# Patient Record
Sex: FEMALE | Race: WHITE
[De-identification: names, ages, dates, MRNs, and addresses within clinical notes are randomized per-mention and may not be internally consistent; named-entity substitution may affect disease eponyms.]

---

## 2019-12-23 ENCOUNTER — HOSPITAL ENCOUNTER (EMERGENCY)
Dept: HOSPITAL 94 - ER | Age: 69
Discharge: HOME | End: 2019-12-23
Payer: MEDICARE

## 2019-12-23 VITALS — DIASTOLIC BLOOD PRESSURE: 70 MMHG | SYSTOLIC BLOOD PRESSURE: 137 MMHG

## 2019-12-23 VITALS — WEIGHT: 178.57 LBS | BODY MASS INDEX: 31.64 KG/M2 | HEIGHT: 63 IN

## 2019-12-23 DIAGNOSIS — E03.9: ICD-10-CM

## 2019-12-23 DIAGNOSIS — Z88.1: ICD-10-CM

## 2019-12-23 DIAGNOSIS — Z98.890: ICD-10-CM

## 2019-12-23 DIAGNOSIS — Z79.899: ICD-10-CM

## 2019-12-23 DIAGNOSIS — F41.9: ICD-10-CM

## 2019-12-23 DIAGNOSIS — Z79.2: ICD-10-CM

## 2019-12-23 DIAGNOSIS — Z88.6: ICD-10-CM

## 2019-12-23 DIAGNOSIS — I10: ICD-10-CM

## 2019-12-23 DIAGNOSIS — J20.9: Primary | ICD-10-CM

## 2019-12-23 DIAGNOSIS — G89.29: ICD-10-CM

## 2019-12-23 DIAGNOSIS — J44.9: ICD-10-CM

## 2019-12-23 DIAGNOSIS — E11.9: ICD-10-CM

## 2019-12-23 DIAGNOSIS — Z88.8: ICD-10-CM

## 2019-12-23 LAB
ALBUMIN SERPL BCP-MCNC: 3.5 G/DL (ref 3.4–5)
ALBUMIN/GLOB SERPL: 0.9 {RATIO} (ref 1.1–1.5)
ALP SERPL-CCNC: 69 IU/L (ref 46–116)
ALT SERPL W P-5'-P-CCNC: 35 U/L (ref 12–78)
ANION GAP SERPL CALCULATED.3IONS-SCNC: 7 MMOL/L (ref 8–16)
APTT PPP: 28 SECONDS (ref 22–32)
AST SERPL W P-5'-P-CCNC: 20 U/L (ref 10–37)
BASOPHILS # BLD AUTO: 0.1 X10'3 (ref 0–0.2)
BASOPHILS NFR BLD AUTO: 0.7 % (ref 0–1)
BILIRUB SERPL-MCNC: 0.4 MG/DL (ref 0.1–1)
BUN SERPL-MCNC: 8 MG/DL (ref 7–18)
BUN/CREAT SERPL: 8.6 (ref 6.6–38)
CALCIUM SERPL-MCNC: 9.1 MG/DL (ref 8.5–10.1)
CHLORIDE SERPL-SCNC: 104 MMOL/L (ref 99–107)
CO2 SERPL-SCNC: 30 MMOL/L (ref 24–32)
CREAT SERPL-MCNC: 0.93 MG/DL (ref 0.4–0.9)
EOSINOPHIL # BLD AUTO: 0.1 X10'3 (ref 0–0.9)
EOSINOPHIL NFR BLD AUTO: 1.4 % (ref 0–6)
ERYTHROCYTE [DISTWIDTH] IN BLOOD BY AUTOMATED COUNT: 14.9 % (ref 11.5–14.5)
GFR SERPL CREATININE-BSD FRML MDRD: 60 ML/MIN
GLUCOSE SERPL-MCNC: 164 MG/DL (ref 70–104)
HCT VFR BLD AUTO: 43.5 % (ref 35–45)
HGB BLD-MCNC: 14.6 G/DL (ref 12–16)
LYMPHOCYTES # BLD AUTO: 2 X10'3 (ref 1.1–4.8)
LYMPHOCYTES NFR BLD AUTO: 21.2 % (ref 21–51)
MAGNESIUM SERPL-MCNC: 1.9 MG/DL (ref 1.5–2.4)
MCH RBC QN AUTO: 29.1 PG (ref 27–31)
MCHC RBC AUTO-ENTMCNC: 33.6 G/DL (ref 33–36.5)
MCV RBC AUTO: 86.6 FL (ref 78–98)
MONOCYTES # BLD AUTO: 0.8 X10'3 (ref 0–0.9)
MONOCYTES NFR BLD AUTO: 8.4 % (ref 2–12)
NEUTROPHILS # BLD AUTO: 6.4 X10'3 (ref 1.8–7.7)
NEUTROPHILS NFR BLD AUTO: 68.3 % (ref 42–75)
PLATELET # BLD AUTO: 260 X10'3 (ref 140–440)
PMV BLD AUTO: 8.8 FL (ref 7.4–10.4)
POTASSIUM SERPL-SCNC: 4.1 MMOL/L (ref 3.5–5.1)
PROT SERPL-MCNC: 7.4 G/DL (ref 6.4–8.2)
RBC # BLD AUTO: 5.02 X10'6 (ref 4.2–5.6)
SODIUM SERPL-SCNC: 141 MMOL/L (ref 135–145)
WBC # BLD AUTO: 9.4 X10'3 (ref 4.5–11)

## 2019-12-23 PROCEDURE — 93005 ELECTROCARDIOGRAM TRACING: CPT

## 2019-12-23 PROCEDURE — 83735 ASSAY OF MAGNESIUM: CPT

## 2019-12-23 PROCEDURE — 80053 COMPREHEN METABOLIC PANEL: CPT

## 2019-12-23 PROCEDURE — 94760 N-INVAS EAR/PLS OXIMETRY 1: CPT

## 2019-12-23 PROCEDURE — 87502 INFLUENZA DNA AMP PROBE: CPT

## 2019-12-23 PROCEDURE — 87040 BLOOD CULTURE FOR BACTERIA: CPT

## 2019-12-23 PROCEDURE — 83880 ASSAY OF NATRIURETIC PEPTIDE: CPT

## 2019-12-23 PROCEDURE — 94644 CONT INHLJ TX 1ST HOUR: CPT

## 2019-12-23 PROCEDURE — 85730 THROMBOPLASTIN TIME PARTIAL: CPT

## 2019-12-23 PROCEDURE — 87503 INFLUENZA DNA AMP PROB ADDL: CPT

## 2019-12-23 PROCEDURE — 71046 X-RAY EXAM CHEST 2 VIEWS: CPT

## 2019-12-23 PROCEDURE — 99285 EMERGENCY DEPT VISIT HI MDM: CPT

## 2019-12-23 PROCEDURE — 94640 AIRWAY INHALATION TREATMENT: CPT

## 2019-12-23 PROCEDURE — 85610 PROTHROMBIN TIME: CPT

## 2019-12-23 PROCEDURE — 85025 COMPLETE CBC W/AUTO DIFF WBC: CPT

## 2019-12-23 PROCEDURE — 84484 ASSAY OF TROPONIN QUANT: CPT

## 2019-12-23 PROCEDURE — 36415 COLL VENOUS BLD VENIPUNCTURE: CPT

## 2019-12-23 PROCEDURE — 83605 ASSAY OF LACTIC ACID: CPT

## 2019-12-23 NOTE — NUR
PATIENT C/O CP, HEAVINESS EPIGASTRIC, RADUATING TO BACK, DR DEL VALLE INFORMED, STAT 
EKG DONE, RIGHT AC PIV INSERTED 20 G, VSS, SR

## 2022-06-05 ENCOUNTER — HOSPITAL ENCOUNTER (EMERGENCY)
Dept: HOSPITAL 94 - ER | Age: 72
Discharge: HOME | End: 2022-06-05
Payer: MEDICARE

## 2022-06-05 VITALS — SYSTOLIC BLOOD PRESSURE: 122 MMHG | DIASTOLIC BLOOD PRESSURE: 82 MMHG

## 2022-06-05 VITALS — WEIGHT: 180.78 LBS | BODY MASS INDEX: 33.27 KG/M2 | HEIGHT: 62 IN

## 2022-06-05 DIAGNOSIS — R10.9: Primary | ICD-10-CM

## 2022-06-05 DIAGNOSIS — R07.81: ICD-10-CM

## 2022-06-05 LAB
ALBUMIN SERPL BCP-MCNC: 3.7 G/DL (ref 3.4–5)
ALBUMIN/GLOB SERPL: 1.1 {RATIO} (ref 1.1–1.5)
ALP SERPL-CCNC: 102 IU/L (ref 46–116)
ALT SERPL W P-5'-P-CCNC: 27 U/L (ref 12–78)
ANION GAP SERPL CALCULATED.3IONS-SCNC: 9 MMOL/L (ref 8–16)
AST SERPL W P-5'-P-CCNC: 22 U/L (ref 10–37)
BASOPHILS # BLD AUTO: 0 X10'3 (ref 0–0.2)
BASOPHILS NFR BLD AUTO: 0.6 % (ref 0–1)
BILIRUB SERPL-MCNC: 0.3 MG/DL (ref 0.1–1)
BUN SERPL-MCNC: 10 MG/DL (ref 7–18)
BUN/CREAT SERPL: 10.2 (ref 6.6–38)
CALCIUM SERPL-MCNC: 8.9 MG/DL (ref 8.5–10.1)
CHLORIDE SERPL-SCNC: 106 MMOL/L (ref 99–107)
CLARITY UR: CLEAR
CO2 SERPL-SCNC: 26.3 MMOL/L (ref 24–32)
COLOR UR: YELLOW
CREAT SERPL-MCNC: 0.98 MG/DL (ref 0.4–0.9)
EOSINOPHIL # BLD AUTO: 0.1 X10'3 (ref 0–0.9)
EOSINOPHIL NFR BLD AUTO: 0.7 % (ref 0–6)
ERYTHROCYTE [DISTWIDTH] IN BLOOD BY AUTOMATED COUNT: 15.3 % (ref 11.5–14.5)
GFR SERPL CREATININE-BSD FRML MDRD: 56 ML/MIN
GLUCOSE SERPL-MCNC: 113 MG/DL (ref 70–104)
GLUCOSE UR STRIP-MCNC: NEGATIVE MG/DL
HCT VFR BLD AUTO: 41.6 % (ref 35–45)
HGB BLD-MCNC: 13.7 G/DL (ref 12–16)
HGB UR QL STRIP: NEGATIVE
KETONES UR STRIP-MCNC: NEGATIVE MG/DL
LEUKOCYTE ESTERASE UR QL STRIP: NEGATIVE
LIPASE SERPL-CCNC: 330 U/L (ref 73–393)
LYMPHOCYTES # BLD AUTO: 2.1 X10'3 (ref 1.1–4.8)
LYMPHOCYTES NFR BLD AUTO: 26.7 % (ref 21–51)
MCH RBC QN AUTO: 27 PG (ref 27–31)
MCHC RBC AUTO-ENTMCNC: 32.9 G/DL (ref 33–36.5)
MCV RBC AUTO: 82.1 FL (ref 78–98)
MONOCYTES # BLD AUTO: 0.7 X10'3 (ref 0–0.9)
MONOCYTES NFR BLD AUTO: 8.8 % (ref 2–12)
NEUTROPHILS # BLD AUTO: 4.8 X10'3 (ref 1.8–7.7)
NEUTROPHILS NFR BLD AUTO: 63.2 % (ref 42–75)
NITRITE UR QL STRIP: NEGATIVE
PH UR STRIP: 5 [PH] (ref 4.8–8)
PLATELET # BLD AUTO: 249 X10'3 (ref 140–440)
PMV BLD AUTO: 8.6 FL (ref 7.4–10.4)
POTASSIUM SERPL-SCNC: 4.4 MMOL/L (ref 3.5–5.1)
PROT SERPL-MCNC: 7.2 G/DL (ref 6.4–8.2)
PROT UR QL STRIP: NEGATIVE MG/DL
RBC # BLD AUTO: 5.06 X10'6 (ref 4.2–5.6)
SODIUM SERPL-SCNC: 141 MMOL/L (ref 135–145)
SP GR UR STRIP: 1.01 (ref 1–1.03)
URN COLLECT METHOD CLASS: (no result)
UROBILINOGEN UR STRIP-MCNC: 0.2 E.U/DL (ref 0.2–1)
WBC # BLD AUTO: 7.7 X10'3 (ref 4.5–11)

## 2022-06-05 PROCEDURE — 99284 EMERGENCY DEPT VISIT MOD MDM: CPT

## 2022-06-05 PROCEDURE — 96375 TX/PRO/DX INJ NEW DRUG ADDON: CPT

## 2022-06-05 PROCEDURE — 74176 CT ABD & PELVIS W/O CONTRAST: CPT

## 2022-06-05 PROCEDURE — 96374 THER/PROPH/DIAG INJ IV PUSH: CPT

## 2022-06-05 PROCEDURE — 81003 URINALYSIS AUTO W/O SCOPE: CPT

## 2022-06-05 PROCEDURE — 80053 COMPREHEN METABOLIC PANEL: CPT

## 2022-06-05 PROCEDURE — 83690 ASSAY OF LIPASE: CPT

## 2022-06-05 PROCEDURE — 85025 COMPLETE CBC W/AUTO DIFF WBC: CPT

## 2022-06-05 PROCEDURE — 36415 COLL VENOUS BLD VENIPUNCTURE: CPT

## 2022-06-05 PROCEDURE — 85610 PROTHROMBIN TIME: CPT

## 2025-06-10 ENCOUNTER — HOSPITAL ENCOUNTER (OUTPATIENT)
Dept: HOSPITAL 94 - RAD | Age: 75
Discharge: HOME | End: 2025-06-10
Attending: PHYSICIAN ASSISTANT
Payer: MEDICARE

## 2025-06-10 DIAGNOSIS — M54.6: ICD-10-CM

## 2025-06-10 DIAGNOSIS — M62.830: ICD-10-CM

## 2025-06-10 DIAGNOSIS — M40.204: ICD-10-CM

## 2025-06-10 DIAGNOSIS — M48.07: Primary | ICD-10-CM

## 2025-06-10 DIAGNOSIS — M47.814: ICD-10-CM

## 2025-06-10 DIAGNOSIS — M54.16: ICD-10-CM

## 2025-06-10 PROCEDURE — 72131 CT LUMBAR SPINE W/O DYE: CPT
